# Patient Record
Sex: MALE | Race: WHITE | URBAN - METROPOLITAN AREA
[De-identification: names, ages, dates, MRNs, and addresses within clinical notes are randomized per-mention and may not be internally consistent; named-entity substitution may affect disease eponyms.]

---

## 2017-05-15 ENCOUNTER — PRE VISIT (OUTPATIENT)
Dept: ORTHOPEDICS | Facility: CLINIC | Age: 42
End: 2017-05-15

## 2017-05-15 NOTE — TELEPHONE ENCOUNTER
1.  Date/reason for appt: 5/22/17 1PM - Chondral Defect of Medial Femoral Condyle   2.  Referring provider: Dr. Dc Stevens  3.  Call to patient (Yes / No - short description): Yes - LVM for pt. Need SANDHYA form for [facility]  4.  Previous care at / records requested from:    - [facility] -- Records received, will forward to clinic.   - [facility] -- Pt has XR's here -- NEED SANDHYA      Included:  Office notes: 4/6/17, 3/27/17  Telephone note: 4/4/17  Radiology report: MRI R Knee 3/31/17 ([facility]) -- Contacted to mail CD of imaging

## 2017-05-15 NOTE — TELEPHONE ENCOUNTER
Spoke to pt's wife Grace, will email SANDHYA form to: [email or web address]    She will give to pt to sign and will return form.

## 2017-05-15 NOTE — TELEPHONE ENCOUNTER
Records received from [facility]. Waiting for image.   Included  Office notes: 10/6/16  Radiology reports: right knee on 10/6/16- report in records

## 2017-05-15 NOTE — TELEPHONE ENCOUNTER
SANDHYA received, faxed request to [facility] for records and XR imaging

## 2017-05-16 NOTE — TELEPHONE ENCOUNTER
Imaging disc received from [facility], will send to film room.    MRI R Knee 3/31/17

## 2017-05-22 ENCOUNTER — OFFICE VISIT (OUTPATIENT)
Dept: ORTHOPEDICS | Facility: CLINIC | Age: 42
End: 2017-05-22

## 2017-05-22 VITALS — BODY MASS INDEX: 30.8 KG/M2 | HEIGHT: 71 IN | WEIGHT: 220 LBS

## 2017-05-22 DIAGNOSIS — G89.29 CHRONIC PAIN OF RIGHT KNEE: Primary | ICD-10-CM

## 2017-05-22 DIAGNOSIS — M25.561 CHRONIC PAIN OF RIGHT KNEE: Primary | ICD-10-CM

## 2017-05-22 ASSESSMENT — ENCOUNTER SYMPTOMS
STIFFNESS: 0
NECK PAIN: 0
MYALGIAS: 0
MUSCLE CRAMPS: 0
MUSCLE WEAKNESS: 0
ARTHRALGIAS: 1
JOINT SWELLING: 1
BACK PAIN: 0

## 2017-05-22 NOTE — PROGRESS NOTES
ORTHOPEDIC CLINIC NOTE - NEW OUTPATIENT CONSULTATION       CHIEF COMPLAINT:  Right knee pain.      HISTORY OF PRESENT ILLNESS:  Mr. Slade is a 42-year-old male with approximately 6 months of right knee pain localized to the medial aspect of the knee.  There were no specific inciting events.  The pain is exacerbated by activity, and he does have significant pain by the end of day at [employer], where he is on his feet most of the day.  The patient has attempted an injection, which lasted only about a month, and has been using over-the-counter anti-inflammatories.  The patient is here to discuss possible operative intervention to alleviate his right knee pain.  The patient is having no left knee pain, no groin pain, no back pain.  The patient likes biking.  His pain is not significant while biking, but is significant while walking and attempting to use a treadmill.  The patient denies any injury to the right knee.      REVIEW OF SYSTEMS:  A 12-point review of systems is otherwise negative.      PAST MEDICAL HISTORY:  None.      PAST SURGICAL HISTORY:  None.      ALLERGIES:  None.      FAMILY HISTORY:  No bleeding, clotting disorders or reactions to anesthesia.      SOCIAL HISTORY:  The patient works as [employer] as a manager.  He lives in the [facility].  He is , and he is here with his wife.  He does not have any bad habits.  He does not smoke.      PHYSICAL EXAMINATION:  The patient is 5 feet 11 inches, 220 pounds, BMI 31.  He is in no acute distress, breathing comfortably.  Specific examination of the right knee demonstrates full extension to 120 degrees of flexion, stable to varus and valgus stress, anterior and posterior drawer.  No pain with hip range of motion.  No pain with straight leg raise.  The patient is tender over the medial joint line, nontender over the lateral joint line.  Nontender over the tibial tubercle.  Nontender over the pes anserinus insertion.  The patient has no significant  effusion.      IMAGING:  X-rays of the right knee are reviewed, which demonstrate well preserved medial joint space.  Standing alignment films are obtained today, which demonstrate the patient and is in 4 degrees of varus and would require a 7-degree correction if high tibial osteotomy were to be performed.  MRI of the right knee was previously obtained, which demonstrates a 7 x 6 mm medial femoral condyle cartilage defect with associated bone edema.  The remainder of the cartilage appears well preserved.  Menisci are intact.  ACL, PCL and collateral ligaments are also intact.      ASSESSMENT:   1.  Minimal osteoarthritis, right knee.   2.  Focal cartilage defect of the medial femoral condyle, 7 x 6 mm.   3.  4 degrees of varus alignment.      PLAN:  We discussed with Mr. Slade possible options moving forward, including continued observation, anti-inflammatories and medial off- brace.  We discussed cartilage procedures, including a possible high tibial osteotomy with an allograft cartilage procedure versus possible NeoCart randomization to either the NeoCart implantation versus microfracture.  The patient would like to enroll in the NeoCart study.  We will contact the study coordinators and get the patient enrolled.  We discussed the risks and benefits at length and alternatives.       Dr. Wayne saw and evaluated the patient, and is in agreement with the above stated plan.   Dictated by Brisa Ellis MD, Resident       Answers for HPI/ROS submitted by the patient on 5/22/2017   General Symptoms: No  Skin Symptoms: No  HENT Symptoms: No  EYE SYMPTOMS: No  HEART SYMPTOMS: No  LUNG SYMPTOMS: No  INTESTINAL SYMPTOMS: No  URINARY SYMPTOMS: No  REPRODUCTIVE SYMPTOMS: No  SKELETAL SYMPTOMS: Yes  BLOOD SYMPTOMS: No  NERVOUS SYSTEM SYMPTOMS: No  MENTAL HEALTH SYMPTOMS: No  Back pain: No  Muscle aches: No  Neck pain: No  Swollen joints: Yes  Joint pain: Yes  Bone pain: Yes  Muscle cramps: No  Muscle weakness:  No  Joint stiffness: No  Bone fracture: No

## 2017-05-22 NOTE — MR AVS SNAPSHOT
"              After Visit Summary   5/22/2017    Zelalem Slade    MRN: [id number]           Patient Information     Date Of Birth          1975        Visit Information        Provider Department      5/22/2017 1:00 PM Dhruv Wayne MD [facility]        Today's Diagnoses     Chronic pain of right knee    -  1       Follow-ups after your visit        Who to contact     Please call your clinic at [phone] to:    Ask questions about your health    Make or cancel appointments    Discuss your medicines    Learn about your test results    Speak to your doctor   If you have compliments or concerns about an experience at your clinic, or if you wish to file a complaint, please contact [facility] Patient Relations at [phone] or email us at [email or web address]         Additional Information About Your Visit        MyChart Information     [employer] gives you secure access to your electronic health record. If you see a primary care provider, you can also send messages to your care team and make appointments. If you have questions, please call your primary care clinic.  If you do not have a primary care provider, please call [phone] and they will assist you.      [employer] is an electronic gateway that provides easy, online access to your medical records. With [employer], you can request a clinic appointment, read your test results, renew a prescription or communicate with your care team.     To access your existing account, please contact your [facility] or call [phone] for assistance.        Care EveryWhere ID     This is your Care EveryWhere ID. This could be used by other organizations to access your [place] medical records  [id number]        Your Vitals Were     Height BMI (Body Mass Index)                5' 11\" (1.803 m) 30.68 kg/m2           Blood Pressure from Last 3 Encounters:   No data found for " BP    Weight from Last 3 Encounters:   05/22/17 220 lb (99.8 kg)               Primary Care Provider Office Phone # Fax #    Esteban Short [phone]       [facility] [place] [address]  [place] [address]        Thank you!     Thank you for choosing [facility]  for your care. Our goal is always to provide you with excellent care. Hearing back from our patients is one way we can continue to improve our services. Please take a few minutes to complete the written survey that you may receive in the mail after your visit with us. Thank you!             Your Updated Medication List - Protect others around you: Learn how to safely use, store and throw away your medicines at www.disposemymeds.org.      Notice  As of 5/22/2017 11:59 PM    You have not been prescribed any medications.

## 2017-05-22 NOTE — PROGRESS NOTES
Patient seen and examined with the resident.     Assesment: Right knee MFC chondral defect, localized    Plan: reviewed options, I think he is a candidate for cartilage reconstruction, explained study  - Neocart vs. Microfracture - patient interested, will have our  contact    If he is not a candidate for study and/or decides not to participate, the plan will be EUA, staging arthroscopy and debridement. If symtpoms persist will do OC allograft and HTO (or treatment as determined at timing of staging arthroscopy.    I agree with history, physical and imaging as well as the assessment and plan as detailed by Dr. Ellis.

## 2017-05-22 NOTE — LETTER
5/22/2017       RE: Zelalem Slade  [address] [place] [address]     Dear Colleague,    Thank you for referring your patient, Zelalem Slade, to the [facility]THOP[facility] at [facility]. Please see a copy of my visit note below.    Patient seen and examined with the resident.     Assesment: Right knee MFC chondral defect, localized    Plan: reviewed options, I think he is a candidate for cartilage reconstruction, explained study  - Neocart vs. Microfracture - patient interested, will have our  contact    If he is not a candidate for study and/or decides not to participate, the plan will be EUA, staging arthroscopy and debridement. If symtpoms persist will do OC allograft and HTO (or treatment as determined at timing of staging arthroscopy.    I agree with history, physical and imaging as well as the assessment and plan as detailed by Dr. Ellis.         ORTHOPEDIC CLINIC NOTE - NEW OUTPATIENT CONSULTATION       CHIEF COMPLAINT:  Right knee pain.      HISTORY OF PRESENT ILLNESS:  Mr. Slade is a 42-year-old male with approximately 6 months of right knee pain localized to the medial aspect of the knee.  There were no specific inciting events.  The pain is exacerbated by activity, and he does have significant pain by the end of day at [employer], where he is on his feet most of the day.  The patient has attempted an injection, which lasted only about a month, and has been using over-the-counter anti-inflammatories.  The patient is here to discuss possible operative intervention to alleviate his right knee pain.  The patient is having no left knee pain, no groin pain, no back pain.  The patient likes biking.  His pain is not significant while biking, but is significant while walking and attempting to use a treadmill.  The patient denies any injury to the right knee.      REVIEW OF SYSTEMS:  A 12-point review of systems is otherwise  negative.      PAST MEDICAL HISTORY:  None.      PAST SURGICAL HISTORY:  None.      ALLERGIES:  None.      FAMILY HISTORY:  No bleeding, clotting disorders or reactions to anesthesia.      SOCIAL HISTORY:  The patient works as [employer] as a manager.  [place].  He is , and he is here with his wife.  He does not have any bad habits.  He does not smoke.      PHYSICAL EXAMINATION:  The patient is 5 feet 11 inches, 220 pounds, BMI 31.  He is in no acute distress, breathing comfortably.  Specific examination of the right knee demonstrates full extension to 120 degrees of flexion, stable to varus and valgus stress, anterior and posterior drawer.  No pain with hip range of motion.  No pain with straight leg raise.  The patient is tender over the medial joint line, nontender over the lateral joint line.  Nontender over the tibial tubercle.  Nontender over the pes anserinus insertion.  The patient has no significant effusion.      IMAGING:  X-rays of the right knee are reviewed, which demonstrate well preserved medial joint space.  Standing alignment films are obtained today, which demonstrate the patient and is in 4 degrees of varus and would require a 7-degree correction if high tibial osteotomy were to be performed.  MRI of the right knee was previously obtained, which demonstrates a 7 x 6 mm medial femoral condyle cartilage defect with associated bone edema.  The remainder of the cartilage appears well preserved.  Menisci are intact.  ACL, PCL and collateral ligaments are also intact.      ASSESSMENT:   1.  Minimal osteoarthritis, right knee.   2.  Focal cartilage defect of the medial femoral condyle, 7 x 6 mm.   3.  4 degrees of varus alignment.      PLAN:  We discussed with Mr. Slade possible options moving forward, including continued observation, anti-inflammatories and medial off- brace.  We discussed cartilage procedures, including a possible high tibial osteotomy with an  allograft cartilage procedure versus possible NeoCart randomization to either the NeoCart implantation versus microfracture.  The patient would like to enroll in the NeoCart study.  We will contact the study coordinators and get the patient enrolled.  We discussed the risks and benefits at length and alternatives.       Dr. Wayne saw and evaluated the patient, and is in agreement with the above stated plan.   Dictated by Brisa Ellis MD, Resident         Again, thank you for allowing me to participate in the care of your patient.      Sincerely,    Dhruv Wayne MD

## 2017-05-22 NOTE — NURSING NOTE
"Reason For Visit:   Chief Complaint   Patient presents with     Musculoskeletal Problem     Chondral defect of medial femoral condyle, right knee, MRI in [facility], Dr Stevens referring [facility]     Age: 42 year old    Occupation:  at [employer].  Currently working? Yes.  Work status?  Full time.  Date of injury: 10/2/16, no traumatic injury, lots of walking/hunting caused flare up    Date of surgery: No surgery to knee    Smoker: No    Pain Assessment  Patient Currently in Pain: Yes  Primary Pain Location: Knee  Pain Orientation: Right  Pain Descriptors: Aching (\"Bone pain\")  Aggravating Factors: Walking, Bending    Ht 1.803 m (5' 11\")  Wt 99.8 kg (220 lb)  BMI 30.68 kg/m2                                                   "

## 2017-06-19 ENCOUNTER — TELEPHONE (OUTPATIENT)
Dept: ORTHOPEDICS | Facility: CLINIC | Age: 42
End: 2017-06-19

## 2017-06-19 NOTE — TELEPHONE ENCOUNTER
Tried to e-mail document again to e-mail listed.  Also sent a message to Stephanie at [facility] where patient will have surgery to see if they can e-mail the pre-op physical forms.  Called and left a message to let family know.  Saida Lemus RN

## 2017-06-19 NOTE — TELEPHONE ENCOUNTER
[facility] Call Center    Phone Message    Name of Caller: Carmina    Phone Number: Cell number on file:    Telephone Information:   Mobile [phone]       Best time to return call: ANY    May a detailed message be left on voicemail: yes    Relation to patient: Self    Reason for Call: Other: Please resend Pre op form, the email did not have an attachment sent. wanted clinic to know she has a Mac computer     Action Taken: Message routed to:  Adult Clinics: Orthopedics p [id number]

## 2017-06-29 ENCOUNTER — OFFICE VISIT (OUTPATIENT)
Dept: ORTHOPEDICS | Facility: CLINIC | Age: 42
End: 2017-06-29
Payer: COMMERCIAL

## 2017-06-29 VITALS — DIASTOLIC BLOOD PRESSURE: 94 MMHG | OXYGEN SATURATION: 96 % | HEART RATE: 68 BPM | SYSTOLIC BLOOD PRESSURE: 125 MMHG

## 2017-06-29 DIAGNOSIS — G89.29 CHRONIC PAIN OF RIGHT KNEE: Primary | ICD-10-CM

## 2017-06-29 DIAGNOSIS — M25.561 CHRONIC PAIN OF RIGHT KNEE: Primary | ICD-10-CM

## 2017-06-29 PROCEDURE — 99024 POSTOP FOLLOW-UP VISIT: CPT | Mod: GC | Performed by: ORTHOPAEDIC SURGERY

## 2017-06-29 ASSESSMENT — PAIN SCALES - GENERAL: PAINLEVEL: MODERATE PAIN (5)

## 2017-06-29 NOTE — MR AVS SNAPSHOT
After Visit Summary   6/29/2017    Zelalem Slade    MRN: [id number]           Patient Information     Date Of Birth          1975        Visit Information        Provider Department      6/29/2017 1:30 PM Dhruv Wayne MD [facility]        Today's Diagnoses     Chronic pain of right knee    -  1      Care Instructions    Thanks for coming today.  Ortho/Sports Medicine Clinic  [address] [place] [address]    To schedule future appointments in Ortho Clinic, you may call [phone].    To schedule ordered imaging by your Provider: Call [facility] at [phone]    [employer] available online at:   [email or web address]    Please call if any further questions or concerns [phone] and ask for the Orthopedic Department. Clinic hours 8 am to 5 pm.    Return to clinic if symptoms worsen.            Follow-ups after your visit        Who to contact     If you have questions or need follow up information about today's clinic visit or your schedule please contact [facility] directly at [phone].  Normal or non-critical lab and imaging results will be communicated to you by [employer], letter or phone within 4 business days after the clinic has received the results. If you do not hear from us within 7 days, please contact the clinic through [employer] or phone. If you have a critical or abnormal lab result, we will notify you by phone as soon as possible.  Submit refill requests through [employer] or call your pharmacy and they will forward the refill request to us. Please allow 3 business days for your refill to be completed.          Additional Information About Your Visit        [employer] Information     [employer] gives you secure access to your electronic health record. If you see a primary care provider, you can also send messages to your care team and make appointments. If you have questions, please call your primary care  clinic.  If you do not have a primary care provider, please call [phone] and they will assist you.      [employer] is an electronic gateway that provides easy, online access to your medical records. With [employer], you can request a clinic appointment, read your test results, renew a prescription or communicate with your care team.     To access your existing account, please contact your [facility] or call [phone] for assistance.        Care EveryWhere ID     This is your Care EveryWhere ID. This could be used by other organizations to access your [place] medical records  [id number]        Your Vitals Were     Pulse Pulse Oximetry                68 96%           Blood Pressure from Last 3 Encounters:   06/29/17 (!) 125/94    Weight from Last 3 Encounters:   05/22/17 99.8 kg (220 lb)              Today, you had the following     No orders found for display       Primary Care Provider Office Phone # Fax #    Esteban Short [phone]       [address] [address]        Equal Access to Services     [facility]: Hadii aad ku hadasho Soomaali, waaxda luqadaha, qaybta kaalmada adeegyada, waxay idiin hayaan ademallory khrosalina potts . So [facility] [phone].    ATENCIÓN: Si habla español, tiene a marley disposición servicios gratuitos de asistencia lingüística. Llame al [phone].    We comply with applicable federal civil rights laws and [place] laws. We do not discriminate on the basis of race, color, national origin, age, disability sex, sexual orientation or gender identity.            Thank you!     Thank you for choosing [facility]  for your care. Our goal is always to provide you with excellent care. Hearing back from our patients is one way we can continue to improve our services. Please take a few minutes to complete the written survey that you may receive in the mail after your visit with us. Thank  you!             Your Updated Medication List - Protect others around you: Learn how to safely use, store and throw away your medicines at www.disposemymeds.org.          This list is accurate as of: 6/29/17  1:53 PM.  Always use your most recent med list.                   Brand Name Dispense Instructions for use Diagnosis    ACETAMINOPHEN PO

## 2017-06-29 NOTE — PROGRESS NOTES
Patient seen and examined with the resident.     Assesment: 1 week following arthroscopic biopsy and enrollment in neocart prospective study    Plan: wbat, rom as gene, doing well, await implantation in august.     I agree with history, physical and imaging as well as the assessment and plan as detailed by Dr. Méndez.

## 2017-06-29 NOTE — NURSING NOTE
"Zelalem Slade's goals for this visit include: 1 week postop right knee  He requests these members of his care team be copied on today's visit information: yes    PCP: Esteban Short    Referring Provider:  No referring provider defined for this encounter.    Chief Complaint   Patient presents with     Surgical Followup     1 week postop-surgery at [facility]       Initial BP (!) 125/94  Pulse 68  SpO2 96% Estimated body mass index is 30.68 kg/(m^2) as calculated from the following:    Height as of 5/22/17: 1.803 m (5' 11\").    Weight as of 5/22/17: 99.8 kg (220 lb).  Medication Reconciliation: complete    "

## 2017-06-29 NOTE — LETTER
June 29, 2017      RE: Zelalem Slade  [address] [facility] [address]       To whom it may concern:    Zelalem Slade was seen in our clinic today. He may return to work for no more than 50 hours per week.     Sincerely,      Dhruv Wayne MD

## 2017-06-29 NOTE — PROGRESS NOTES
ORTHOPEDIC CLINIC NOTE     CHIEF COMPLAINT:  Follow up right knee arthroscopy and cartilage biopsy for neocart study, DOS 6/23/17 ([facility])     HISTORY OF PRESENT ILLNESS:  Mr. Slade is a 42-year-old male who returns 1 week s/p the above surgery. He has been doing well, did not like the oxycodone so he has been taking tylenol but this is giving him good pain control. 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 No concerns today.     PHYSICAL EXAMINATION:    Alert, pleasant, conversant male in no acute distress, breathing comfortably.    Examination of the right knee shows a small effusion, steristrips in place over his 2 portal incisions which are clean and dry, no erythema. No pain with gentle knee ROM 0-90. Stability not assessed today. Palpable DP and 5/5 strength throughout.          ASSESSMENT:   1.  Minimal osteoarthritis, right knee.   2.  Focal cartilage defect of the medial femoral condyle, 7 x 6 mm.   3.  1 week s/p right knee arthroscopy and cartilage biopsy for staged neocart proced     PLAN:  The patient may continue gentle activities as tolerated, no restrictions on walking or motion. No running, jumping, or pivoting/cutting type activities. He may shower but should not submerge incisions for 1 more week. We will coordinate his second stage surgery per the research protocol, likely later this summer but definitive date not yet set, no further follow up needed prior to that procedure.     Dr. Wayne saw and evaluated the patient, and is in agreement with the above stated plan.     Candida Méndez MD, Resident

## 2017-06-29 NOTE — PATIENT INSTRUCTIONS
Thanks for coming today.  Ortho/Sports Medicine Clinic  [address] [place] [address]    To schedule future appointments in Ortho Clinic, you may call [phone].    To schedule ordered imaging by your Provider: Call [facility] at [phone]    [employer] available online at:   [email or web address]    Please call if any further questions or concerns [phone] and ask for the Orthopedic Department. Clinic hours 8 am to 5 pm.    Return to clinic if symptoms worsen.

## 2017-08-15 ENCOUNTER — TRANSFERRED RECORDS (OUTPATIENT)
Dept: HEALTH INFORMATION MANAGEMENT | Facility: CLINIC | Age: 42
End: 2017-08-15

## 2017-08-16 ENCOUNTER — TELEPHONE (OUTPATIENT)
Dept: ORTHOPEDICS | Facility: CLINIC | Age: 42
End: 2017-08-16

## 2017-08-16 DIAGNOSIS — Z98.890 S/P RIGHT KNEE SURGERY: Primary | ICD-10-CM

## 2017-08-16 NOTE — TELEPHONE ENCOUNTER
Left voicemail for patient, in regards to his request for a shower chair and questions they have regarding dressing change. Callback number was left.

## 2017-08-18 DIAGNOSIS — Z98.890 S/P KNEE SURGERY: Primary | ICD-10-CM

## 2017-08-18 DIAGNOSIS — Z53.9 ERRONEOUS ENCOUNTER--DISREGARD: ICD-10-CM

## 2017-08-18 RX ORDER — GAUZE BANDAGE 4" X 4"
SPONGE TOPICAL
Qty: 10 EACH | Refills: 1 | Status: CANCELLED | OUTPATIENT
Start: 2017-08-18

## 2017-08-18 NOTE — TELEPHONE ENCOUNTER
Zelalem's wife Oneida called stating that Zelalem had surgery and is going to be able to shower in 3 days and she is wanting to know if we can give her a script for a shower chair and she also wants gauze to put back on the incision and how to dress it. She states she had wrist surgery in the past and they gave her a script for everything she needs and was hoping to get the same thing for him.   I told her in informed that we don't typically give scripts for wound management and dressing but I would ask Dr. Wayne. I told her we would be able to get him a shower chair and that I would touch base with her within the hour to let her know about both.   She also asked about getting him a wheelchair but I told her that we should wait to talk to Dr. Wayne at Zelalem's return appointment next Thursday, she was okay with this plan.    I talked with Jimena, Dr. Gomez's nurse, who didn't think a script for dressings would be a problem. Both were entered. Oneida is going to talk to the different supply companies in the area and see if they will allow a direct fax to them and if so she will get the fax and let me know where to send the scripts.

## 2017-08-18 NOTE — TELEPHONE ENCOUNTER
Zelalem's wife Oneida called back stating that their insurance company would not cover a shower chair thus they were cancelling their request for a prescription for this. In addition, she stated she would just go buy the needed dressings and did not need a prescription for those either. Discussed using 4 x 4 gauze and Telfa for dressing changes until he is seen in clinic at 1 week postop. Patient's wife expressed understanding.

## 2017-08-23 ENCOUNTER — TRANSFERRED RECORDS (OUTPATIENT)
Dept: HEALTH INFORMATION MANAGEMENT | Facility: CLINIC | Age: 42
End: 2017-08-23

## 2017-08-24 ENCOUNTER — OFFICE VISIT (OUTPATIENT)
Dept: ORTHOPEDICS | Facility: CLINIC | Age: 42
End: 2017-08-24
Payer: COMMERCIAL

## 2017-08-24 VITALS — DIASTOLIC BLOOD PRESSURE: 93 MMHG | SYSTOLIC BLOOD PRESSURE: 141 MMHG | HEART RATE: 104 BPM | OXYGEN SATURATION: 96 %

## 2017-08-24 DIAGNOSIS — Z98.890 S/P KNEE SURGERY: Primary | ICD-10-CM

## 2017-08-24 PROCEDURE — 99024 POSTOP FOLLOW-UP VISIT: CPT | Performed by: ORTHOPAEDIC SURGERY

## 2017-08-24 ASSESSMENT — PAIN SCALES - GENERAL: PAINLEVEL: MILD PAIN (3)

## 2017-08-24 NOTE — PROGRESS NOTES
HISTORY OF PRESENT ILLNESS:  Zelalem is a 42-year-old male who returns to my clinic today 1 week following NeoCart implantation to his right medial femoral condyle.  He is doing well.  He is nearly off narcotics and his pain is very well controlled.  He actually says he has no pain at rest.  He has been using his brace.  He is using his CPM machine.        PHYSICAL EXAMINATION:  Well-healed surgical incisions.  Neurovascularly intact.  Flexion is to greater than 90 degrees.      CLINICAL ASSESSMENT:  One week status post NeoCart implantation to medial femoral condyle, right knee.      PLAN:  I had a long discussion today with Zelalem.  At this time, I think he has done tremendously well.  He is toe touch weightbearing for 6 weeks.  He should wear a knee brace when he is up and around.  He can have the brace off when he is sitting and using CMP.  He should use compression when he is using the brace.  He should take an aspirin for the first 6 weeks.  Followup in my Orthopaedic Clinic in 6 weeks.

## 2017-08-24 NOTE — PATIENT INSTRUCTIONS
Thanks for coming today.  Ortho/Sports Medicine Clinic  [address] [place] [address]    To schedule future appointments in Ortho Clinic, you may call [phone].    To schedule ordered imaging by your Provider: Call [facility] at [phone]    [employer] available online at:   [email or web address]    Please call if any further questions or concerns [phone] and ask for the Orthopedic Department. Clinic hours 8 am to 5 pm.    Return to clinic if symptoms worsen.

## 2017-08-24 NOTE — NURSING NOTE
"Zelalem Slade's goals for this visit include: Neocart postop  He requests these members of his care team be copied on today's visit information: yes    PCP: Esteban Short    Referring Provider:  Dhruv Wayne MD  [address] [place] [address]    Chief Complaint   Patient presents with     Surgical Followup     Right knee neocart post op       Initial BP (!) 141/93  Pulse 104  SpO2 96% Estimated body mass index is 30.68 kg/(m^2) as calculated from the following:    Height as of 5/22/17: 1.803 m (5' 11\").    Weight as of 5/22/17: 99.8 kg (220 lb).  Medication Reconciliation: complete    "

## 2017-08-24 NOTE — MR AVS SNAPSHOT
After Visit Summary   8/24/2017    Zelalem Slade    MRN: [id number]           Patient Information     Date Of Birth          1975        Visit Information        Provider Department      8/24/2017 1:00 PM Dhruv Wayne MD [facility]        Today's Diagnoses     S/P knee surgery    -  1      Care Instructions    Thanks for coming today.  Ortho/Sports Medicine Clinic  [address] [place] [address]    To schedule future appointments in Ortho Clinic, you may call [phone].    To schedule ordered imaging by your Provider: Call [facility] at [phone]    [employer] available online at:   [email or web address]    Please call if any further questions or concerns [phone] and ask for the Orthopedic Department. Clinic hours 8 am to 5 pm.    Return to clinic if symptoms worsen.            Follow-ups after your visit        Your next 10 appointments already scheduled     Sep 28, 2017 11:00 AM CDT   Return Visit with Dhruv Wayne MD   [facility] ([facility]    [address] [place] [address] [phone]              Who to contact     If you have questions or need follow up information about today's clinic visit or your schedule please contact [facility] directly at [phone].  Normal or non-critical lab and imaging results will be communicated to you by MyChart, letter or phone within 4 business days after the clinic has received the results. If you do not hear from us within 7 days, please contact the clinic through MyChart or phone. If you have a critical or abnormal lab result, we will notify you by phone as soon as possible.  Submit refill requests through RotoHog or call your pharmacy and they will forward the refill request to us. Please allow 3 business days for your refill to be completed.          Additional Information About Your Visit         Opowerhart Information     Pins gives you secure access to your electronic health record. If you see a primary care provider, you can also send messages to your care team and make appointments. If you have questions, please call your primary care clinic.  If you do not have a primary care provider, please call [phone] and they will assist you.      Pins is an electronic gateway that provides easy, online access to your medical records. With Pins, you can request a clinic appointment, read your test results, renew a prescription or communicate with your care team.     To access your existing account, please contact your [facility] or call [phone] for assistance.        Care EveryWhere ID     This is your Care EveryWhere ID. This could be used by other organizations to access your [place] medical records  [id number]        Your Vitals Were     Pulse Pulse Oximetry                104 96%           Blood Pressure from Last 3 Encounters:   08/24/17 (!) 141/93   06/29/17 (!) 125/94    Weight from Last 3 Encounters:   05/22/17 99.8 kg (220 lb)              Today, you had the following     No orders found for display         Today's Medication Changes          These changes are accurate as of: 8/24/17 11:59 PM.  If you have any questions, ask your nurse or doctor.               Start taking these medicines.        Dose/Directions    order for DME   Used for:  S/P knee surgery        Please dispense wheelchair   Quantity:  1 Device   Refills:  0            Where to get your medicines      Some of these will need a paper prescription and others can be bought over the counter.  Ask your nurse if you have questions.     Bring a paper prescription for each of these medications     order for DME                Primary Care Provider Office Phone # Fax #    Esteban Short [phone] [address] [facility] [address]        Equal Access to Services      EVARISTO PEREA : Hadii aad alison ean Baer, waaxda luqadaha, qaybta kaalmada juanaedwindeo, sandhya brian haygeraldine harrisirenemadison potts . So [facility] [phone].    ATENCIÓN: Si habla español, tiene a marley disposición servicios gratuitos de asistencia lingüística. Llame al [phone].    We comply with applicable federal civil rights laws and [place] laws. We do not discriminate on the basis of race, color, national origin, age, disability sex, sexual orientation or gender identity.            Thank you!     Thank you for choosing [facility]  for your care. Our goal is always to provide you with excellent care. Hearing back from our patients is one way we can continue to improve our services. Please take a few minutes to complete the written survey that you may receive in the mail after your visit with us. Thank you!             Your Updated Medication List - Protect others around you: Learn how to safely use, store and throw away your medicines at www.disposemymeds.org.          This list is accurate as of: 8/24/17 11:59 PM.  Always use your most recent med list.                   Brand Name Dispense Instructions for use Diagnosis    ACETAMINOPHEN PO           ASPIRIN PO      Take 81 mg by mouth        order for DME     1 Device    Please dispense wheelchair    S/P knee surgery

## 2017-09-28 ENCOUNTER — OFFICE VISIT (OUTPATIENT)
Dept: ORTHOPEDICS | Facility: CLINIC | Age: 42
End: 2017-09-28
Payer: COMMERCIAL

## 2017-09-28 VITALS — SYSTOLIC BLOOD PRESSURE: 142 MMHG | OXYGEN SATURATION: 97 % | HEART RATE: 119 BPM | DIASTOLIC BLOOD PRESSURE: 92 MMHG

## 2017-09-28 DIAGNOSIS — Z98.890 S/P KNEE SURGERY: Primary | ICD-10-CM

## 2017-09-28 PROCEDURE — 99024 POSTOP FOLLOW-UP VISIT: CPT | Performed by: ORTHOPAEDIC SURGERY

## 2017-09-28 ASSESSMENT — PAIN SCALES - GENERAL: PAINLEVEL: NO PAIN (0)

## 2017-09-28 NOTE — MR AVS SNAPSHOT
After Visit Summary   9/28/2017    Zelalem Slade    MRN: [id number]           Patient Information     Date Of Birth          1975        Visit Information        Provider Department      9/28/2017 11:00 AM Dhruv Wayne MD [facility]        Today's Diagnoses     S/P knee surgery    -  1      Care Instructions    Thanks for coming today.  Ortho/Sports Medicine Clinic  [address] [place] [address]    To schedule future appointments in Ortho Clinic, you may call [phone].    To schedule ordered imaging by your Provider: Call [facility] at [phone]    M[employer]en available online at:   [email or web address]    Please call if any further questions or concerns [phone] and ask for the Orthopedic Department. Clinic hours 8 am to 5 pm.    Return to clinic if symptoms worsen.            Follow-ups after your visit        Your next 10 appointments already scheduled     Nov 09, 2017 11:30 AM CST   Return Visit with Dhruv Wayne MD   [facility] ([facility])    [address] [place] [address] [phone]              Who to contact     If you have questions or need follow up information about today's clinic visit or your schedule please contact [facility] directly at [phone].  Normal or non-critical lab and imaging results will be communicated to you by MyChart, letter or phone within 4 business days after the clinic has received the results. If you do not hear from us within 7 days, please contact the clinic through MyChart or phone. If you have a critical or abnormal lab result, we will notify you by phone as soon as possible.  Submit refill requests through Macheen or call your pharmacy and they will forward the refill request to us. Please allow 3 business days for your refill to be completed.          Additional Information About Your Visit         Familiohart Information     Pageflakes gives you secure access to your electronic health record. If you see a primary care provider, you can also send messages to your care team and make appointments. If you have questions, please call your primary care clinic.  If you do not have a primary care provider, please call [phone] and they will assist you.      Pageflakes is an electronic gateway that provides easy, online access to your medical records. With Pageflakes, you can request a clinic appointment, read your test results, renew a prescription or communicate with your care team.     To access your existing account, please contact your [facility] or call [phone] for assistance.        Care EveryWhere ID     This is your Care EveryWhere ID. This could be used by other organizations to access your [place] medical records  [id number]        Your Vitals Were     Pulse Pulse Oximetry                119 97%           Blood Pressure from Last 3 Encounters:   09/28/17 (!) 142/92   08/24/17 (!) 141/93   06/29/17 (!) 125/94    Weight from Last 3 Encounters:   05/22/17 99.8 kg (220 lb)              Today, you had the following     No orders found for display       Primary Care Provider Office Phone # Fax #    Esteban Short [phone] [address] [facility] [address]        Equal Access to Services     EVARISTO PEREA AH: Hadii aad ku hadasho Soomaali, waaxda luqadaha, qaybta kaalmada adeegyada, waxay lisain haygeraldine potts . So [facility] [phone].    ATENCIÓN: Si habla español, tiene a marley disposición servicios gratuitos de asistencia lingüística. Llame al [phone].    We comply with applicable federal civil rights laws and [place] laws. We do not discriminate on the basis of race, color, national origin, age, disability sex, sexual orientation or gender identity.            Thank you!     Thank you for choosing [facility]  for  your care. Our goal is always to provide you with excellent care. Hearing back from our patients is one way we can continue to improve our services. Please take a few minutes to complete the written survey that you may receive in the mail after your visit with us. Thank you!             Your Updated Medication List - Protect others around you: Learn how to safely use, store and throw away your medicines at www.disposemymeds.org.          This list is accurate as of: 9/28/17  4:17 PM.  Always use your most recent med list.                   Brand Name Dispense Instructions for use Diagnosis    ACETAMINOPHEN PO           ASPIRIN PO      Take 81 mg by mouth        order for DME     1 Device    Please dispense wheelchair    S/P knee surgery

## 2017-09-28 NOTE — NURSING NOTE
"Zelalem Slade's goals for this visit include: Right knee neocart 6 week check   He requests these members of his care team be copied on today's visit information: yes    PCP: Esteban Short    Referring Provider:  Dhruv Wayne MD  [address] [place] [address]    Chief Complaint   Patient presents with     RECHECK     6 week neocart recheck        Initial BP (!) 142/92  Pulse 119  SpO2 97% Estimated body mass index is 30.68 kg/(m^2) as calculated from the following:    Height as of 5/22/17: 1.803 m (5' 11\").    Weight as of 5/22/17: 99.8 kg (220 lb).  Medication Reconciliation: complete    "

## 2017-09-28 NOTE — PATIENT INSTRUCTIONS
Thanks for coming today.  Ortho/Sports Medicine Clinic  [address] [place] [address]    To schedule future appointments in Ortho Clinic, you may call [phone].    To schedule ordered imaging by your Provider: Call [facility] at [phone]    [employer] available online at:   [email or web address]    Please call if any further questions or concerns [phone] and ask for the Orthopedic Department. Clinic hours 8 am to 5 pm.    Return to clinic if symptoms worsen.

## 2017-09-28 NOTE — PROGRESS NOTES
HISTORY OF PRESENT ILLNESS:  Zelalem is a pleasant 42-year-old male seen through my Orthopaedic Clinic.  He is now 6 weeks status post NeoCart implantation to his right knee.  He has been doing very well, is not having any pain.  He is doing therapy.  He is making good progress with the brace and his crutches.      PHYSICAL EXAMINATION:  On exam, marvin male in no acute distress, articulate and interactive.  Well-healed surgical incisions, full range of motion.  Trace effusion, ligament stable.  Neurovascularly intact.      CLINICAL ASSESSMENT:  Six weeks status post NeoCart implantation, right medial femoral condyle.      PLAN:  I had a long discussion today with Zelalem.  At this time, I think he has done tremendously well, weightbearing as tolerated, range of motion as tolerated, discontinued crutches, discontinue brace.  Followup in my clinic in 6 weeks.

## 2017-10-17 ENCOUNTER — TRANSFERRED RECORDS (OUTPATIENT)
Dept: HEALTH INFORMATION MANAGEMENT | Facility: CLINIC | Age: 42
End: 2017-10-17

## 2017-11-09 ENCOUNTER — OFFICE VISIT (OUTPATIENT)
Dept: ORTHOPEDICS | Facility: CLINIC | Age: 42
End: 2017-11-09
Payer: COMMERCIAL

## 2017-11-09 VITALS — HEART RATE: 83 BPM | OXYGEN SATURATION: 96 % | SYSTOLIC BLOOD PRESSURE: 144 MMHG | DIASTOLIC BLOOD PRESSURE: 90 MMHG

## 2017-11-09 DIAGNOSIS — Z98.890 S/P KNEE SURGERY: Primary | ICD-10-CM

## 2017-11-09 PROCEDURE — 99024 POSTOP FOLLOW-UP VISIT: CPT | Performed by: ORTHOPAEDIC SURGERY

## 2017-11-09 ASSESSMENT — PAIN SCALES - GENERAL: PAINLEVEL: MILD PAIN (2)

## 2017-11-09 NOTE — PROGRESS NOTES
HISTORY OF PRESENT ILLNESS:  Zelalem is a 42-year-old male who returns to my clinic today 3 months following NeoCart implantation to his right medial femoral condyle.  SANE score is 50/100, his preoperative score was 30.  He is happy with the progress, he feels improved from his preoperative state.  He is off narcotics.  He feels like he is walking better and he is more active.      PHYSICAL EXAMINATION:  Pleasant male in no acute distress, articulate and interactive.  Visual inspection shows no redness, no drainage or suggestion of infection.  Ligament stable and neurovascularly intact.  Motion is full on my exam.  Trace effusion at most.      CLINICAL ASSESSMENT:  Three months following the NeoCart implantation mediofemoral condyle, right knee.      PLAN:  I had a long discussion today with Zelalem.  At this time, I think he is doing very well.  He is weightbearing as tolerated.  He is motion as tolerated.  He should work with his therapist to wean from therapy onto a home program.  He will be moving likely to [place], [place] in the next few months and I think hopefully by point he can be doing therapy on his own.  I will plan to see him per the protocol at the 6-month johanne.  I am happy to do this at the [place] as it is somewhat closer for him when he comes down.  He needs to get an MRI that same day and we will try to coordinate this given his long travel time.

## 2017-11-09 NOTE — NURSING NOTE
"Zelalem Slade's goals for this visit include: Right knee 3 month recheck   He requests these members of his care team be copied on today's visit information: yes    PCP: Esteban Short    Referring Provider:  Dhruv Wayne MD  [address] [place] [address]    Chief Complaint   Patient presents with     RECHECK     3 month neocart right knee       Initial /90  Pulse 83  SpO2 96% Estimated body mass index is 30.68 kg/(m^2) as calculated from the following:    Height as of 5/22/17: 1.803 m (5' 11\").    Weight as of 5/22/17: 99.8 kg (220 lb).  Medication Reconciliation: complete    "

## 2017-11-09 NOTE — MR AVS SNAPSHOT
After Visit Summary   11/9/2017    Zelalem Slade    MRN: [id number]           Patient Information     Date Of Birth          1975        Visit Information        Provider Department      11/9/2017 11:30 AM Dhruv Wayne MD [facility]        Today's Diagnoses     S/P knee surgery    -  1      Care Instructions    Thanks for coming today.  Ortho/Sports Medicine Clinic  [address] [place] [address]    To schedule future appointments in Ortho Clinic, you may call [phone].    To schedule ordered imaging by your Provider: Call [facility] at [phone]    [employer] available online at:   [email or web address]    Please call if any further questions or concerns [phone] and ask for the Orthopedic Department. Clinic hours 8 am to 5 pm.    Return to clinic if symptoms worsen.            Follow-ups after your visit        Who to contact     If you have questions or need follow up information about today's clinic visit or your schedule please contact [facility] directly at [phone].  Normal or non-critical lab and imaging results will be communicated to you by Validroidhart, letter or phone within 4 business days after the clinic has received the results. If you do not hear from us within 7 days, please contact the clinic through Tail-f Systemst or phone. If you have a critical or abnormal lab result, we will notify you by phone as soon as possible.  Submit refill requests through Redtree People or call your pharmacy and they will forward the refill request to us. Please allow 3 business days for your refill to be completed.          Additional Information About Your Visit        Validroidhart Information     Redtree People gives you secure access to your electronic health record. If you see a primary care provider, you can also send messages to your care team and make appointments. If you have questions, please call your primary care clinic.  If you  do not have a primary care provider, please call [phone] and they will assist you.      Kaldoora is an electronic gateway that provides easy, online access to your medical records. With Kaldoora, you can request a clinic appointment, read your test results, renew a prescription or communicate with your care team.     To access your existing account, please contact your [facility] or call [phone] for assistance.        Care EveryWhere ID     This is your Care EveryWhere ID. This could be used by other organizations to access your [place] medical records  [id number]        Your Vitals Were     Pulse Pulse Oximetry                83 96%           Blood Pressure from Last 3 Encounters:   11/09/17 144/90   09/28/17 (!) 142/92   08/24/17 (!) 141/93    Weight from Last 3 Encounters:   05/22/17 99.8 kg (220 lb)              Today, you had the following     No orders found for display       Primary Care Provider Office Phone # Fax #    Esteban Short [phone] [address] [facility] [address]        Equal Access to Services     [facility] : Hadii aad ku hadasho Soomaali, waaxda luqadaha, qaybta kaalmada ademalloryyada, sandhya potts . So [facility] [phone].    ATENCIÓN: Si habla español, tiene a marley disposición servicios gratuitos de asistencia lingüística. [facility] [phone].    We comply with applicable federal civil rights laws and [place] laws. We do not discriminate on the basis of race, color, national origin, age, disability, sex, sexual orientation, or gender identity.            Thank you!     Thank you for choosing [facility]  for your care. Our goal is always to provide you with excellent care. Hearing back from our patients is one way we can continue to improve our services. Please take a few minutes to complete the written survey that you may receive in the mail after your visit with us.  Thank you!             Your Updated Medication List - Protect others around you: Learn how to safely use, store and throw away your medicines at www.disposemymeds.org.          This list is accurate as of: 11/9/17  1:24 PM.  Always use your most recent med list.                   Brand Name Dispense Instructions for use Diagnosis    ACETAMINOPHEN PO           ASPIRIN PO      Take 81 mg by mouth        order for DME     1 Device    Please dispense wheelchair    S/P knee surgery

## 2017-11-09 NOTE — PATIENT INSTRUCTIONS
Thanks for coming today.  Ortho/Sports Medicine Clinic  [address] [place] [address]    To schedule future appointments in Ortho Clinic, you may call [phone].    To schedule ordered imaging by your Provider: Call [facility] at [phone]    [employer] available online at:   [email or web address]    Please call if any further questions or concerns [phone] and ask for the Orthopedic Department. Clinic hours 8 am to 5 pm.    Return to clinic if symptoms worsen.

## 2018-04-05 ENCOUNTER — OFFICE VISIT (OUTPATIENT)
Dept: ORTHOPEDICS | Facility: CLINIC | Age: 43
End: 2018-04-05
Payer: COMMERCIAL

## 2018-04-05 VITALS — OXYGEN SATURATION: 98 % | SYSTOLIC BLOOD PRESSURE: 128 MMHG | HEART RATE: 77 BPM | DIASTOLIC BLOOD PRESSURE: 96 MMHG

## 2018-04-05 DIAGNOSIS — Z98.890 S/P KNEE SURGERY: Primary | ICD-10-CM

## 2018-04-05 PROCEDURE — 99207 ZZC NO CHARGE LOS: CPT | Performed by: ORTHOPAEDIC SURGERY

## 2018-04-05 ASSESSMENT — PAIN SCALES - GENERAL: PAINLEVEL: NO PAIN (0)

## 2018-04-05 NOTE — PROGRESS NOTES
DIAGNOSIS:   1. Full-thickness chondral defect medial femoral condyle right knee    PROCEDURES:  1. Neocart implantation to medial femoral condyle right knee    HISTORY:  Zelalem returns to clinic today for interval follow-up is a very pleasant male who is now 6 months following his Neocart implantation surgery.  He is doing tremendously well.  His SANE score is 92 out of 100.  Versus a preop of 30.  He is very happy with the progress that is made.  He does not report any specific limitations    EXAM:     General: Awake, Alert, and oriented. Articulates and communicates with a normal affect     Right lower Extremity:    Incisions well healed without evidence of infection    No effusion    Rang range of motion is full.      Ligaments stable    Neurovascularly intact    IMAGING:  MRI obtained to the research protocol shows excellent incorporation on the medial femoral condyle with no full-thickness chondral defects    ASSESSMENT:  1. 6 months following Neocart implantation to the medial femoral condyle right knee.     PLAN:   Weightbearing as tolerated, motion as tolerated, no specific activity restrictions besides commonsense.  Follow-up at the one-year per the protocol.

## 2018-04-05 NOTE — PATIENT INSTRUCTIONS
Thanks for coming today.  Ortho/Sports Medicine Clinic  [address] [place] [address]    To schedule future appointments in Ortho Clinic, you may call [phone].    To schedule ordered imaging by your Provider: Call [facility] at [phone]    Gigaom available online at:   [email or web address]    Please call if any further questions or concerns [phone] and ask for the Orthopedic Department. Clinic hours 8 am to 5 pm.    Return to clinic if symptoms worsen.

## 2018-04-05 NOTE — LETTER
4/5/2018         RE: Zelalem Slade  [address] [facility] [address]        Dear Colleague,    Thank you for referring your patient, Zelalem Slade, to the [facility]. Please see a copy of my visit note below.    DIAGNOSIS:   1. Full-thickness chondral defect medial femoral condyle right knee    PROCEDURES:  1. Neocart implantation to medial femoral condyle right knee    HISTORY:  Zelalem returns to clinic today for interval follow-up is a very pleasant male who is now 6 months following his Neocart implantation surgery.  He is doing tremendously well.  His SANE score is 92 out of 100.  Versus a preop of 30.  He is very happy with the progress that is made.  He does not report any specific limitations    EXAM:     General: Awake, Alert, and oriented. Articulates and communicates with a normal affect     Right lower Extremity:    Incisions well healed without evidence of infection    No effusion    Rang range of motion is full.      Ligaments stable    Neurovascularly intact    IMAGING:  MRI obtained to the research protocol shows excellent incorporation on the medial femoral condyle with no full-thickness chondral defects    ASSESSMENT:  1. 6 months following Neocart implantation to the medial femoral condyle right knee.     PLAN:   Weightbearing as tolerated, motion as tolerated, no specific activity restrictions besides commonsense.  Follow-up at the one-year per the protocol.        Again, thank you for allowing me to participate in the care of your patient.        Sincerely,        Dhruv Wayne MD

## 2018-04-05 NOTE — NURSING NOTE
"Zelalem Slade's goals for this visit include: Right knee neokart study check   He requests these members of his care team be copied on today's visit information: yes    PCP: Esteban Short    Referring Provider:  Dhruv Wayne MD  [address]  [place] [address]    No chief complaint on file.      Initial BP (!) 128/96  Pulse 77  SpO2 98% Estimated body mass index is 30.68 kg/(m^2) as calculated from the following:    Height as of 5/22/17: 5' 11\" (1.803 m).    Weight as of 5/22/17: 220 lb (99.8 kg).  Medication Reconciliation: complete    "

## 2018-04-05 NOTE — MR AVS SNAPSHOT
After Visit Summary   4/5/2018    Zelalem Slade    MRN: [id number]           Patient Information     Date Of Birth          1975        Visit Information        Provider Department      4/5/2018 11:30 AM Dhruv Wayne MD [facility]        Today's Diagnoses     S/P knee surgery    -  1      Care Instructions    Thanks for coming today.  Ortho/Sports Medicine Clinic  [address] [place] [address]    To schedule future appointments in Ortho Clinic, you may call [phone].    To schedule ordered imaging by your Provider: Call [facility] at [phone]    Wonder Technologies available online at:   Gifts [email or web address] Give/Segway    Please call if any further questions or concerns [phone] and ask for the Orthopedic Department. Clinic hours 8 am to 5 pm.    Return to clinic if symptoms worsen.            Follow-ups after your visit        Who to contact     If you have questions or need follow up information about today's clinic visit or your schedule please contact [facility] directly at [phone].  Normal or non-critical lab and imaging results will be communicated to you by Wiral Internet Grouphart, letter or phone within 4 business days after the clinic has received the results. If you do not hear from us within 7 days, please contact the clinic through Ubiterrat or phone. If you have a critical or abnormal lab result, we will notify you by phone as soon as possible.  Submit refill requests through Wonder Technologies or call your pharmacy and they will forward the refill request to us. Please allow 3 business days for your refill to be completed.          Additional Information About Your Visit        Wiral Internet Grouphart Information     Wonder Technologies gives you secure access to your electronic health record. If you see a primary care provider, you can also send messages to your care team and make appointments. If you have questions, please call your primary care clinic.  If you  do not have a primary care provider, please call [phone] and they will assist you.      Cavis microcaps is an electronic gateway that provides easy, online access to your medical records. With Cavis microcaps, you can request a clinic appointment, read your test results, renew a prescription or communicate with your care team.     To access your existing account, please contact your [facility] or call [phone] for assistance.        Care EveryWhere ID     This is your Care EveryWhere ID. This could be used by other organizations to access your [facility] medical records  [id number]        Your Vitals Were     Pulse Pulse Oximetry                77 98%           Blood Pressure from Last 3 Encounters:   04/05/18 (!) 128/96   11/09/17 144/90   09/28/17 (!) 142/92    Weight from Last 3 Encounters:   05/22/17 220 lb (99.8 kg)              Today, you had the following     No orders found for display       Primary Care Provider Office Phone # Fax #    Esteban Short [phone] [address] [facility] [address]        Equal Access to Services     [facility]: Hadii aad ku hadasho Soomaali, waaxda luqadaha, qaybta kaalmada ademalloryyada, sandhya potts . So [facility] [phone].    ATENCIÓN: Si habla español, tiene a marley disposición servicios gratuitos de asistencia lingüística. [facility] [phone].    We comply with applicable federal civil rights laws and [place] laws. We do not discriminate on the basis of race, color, national origin, age, disability, sex, sexual orientation, or gender identity.            Thank you!     Thank you for choosing [facility]  for your care. Our goal is always to provide you with excellent care. Hearing back from our patients is one way we can continue to improve our services. Please take a few minutes to complete the written survey that you may receive in the mail after your visit with us.  Thank you!             Your Updated Medication List - Protect others around you: Learn how to safely use, store and throw away your medicines at www.disposemymeds.org.          This list is accurate as of 4/5/18  1:57 PM.  Always use your most recent med list.                   Brand Name Dispense Instructions for use Diagnosis    ACETAMINOPHEN PO           ASPIRIN PO      Take 81 mg by mouth        order for DME     1 Device    Please dispense wheelchair    S/P knee surgery

## 2018-07-26 ENCOUNTER — OFFICE VISIT (OUTPATIENT)
Dept: ORTHOPEDICS | Facility: CLINIC | Age: 43
End: 2018-07-26
Payer: COMMERCIAL

## 2018-07-26 VITALS — HEART RATE: 69 BPM | OXYGEN SATURATION: 99 % | SYSTOLIC BLOOD PRESSURE: 128 MMHG | DIASTOLIC BLOOD PRESSURE: 88 MMHG

## 2018-07-26 DIAGNOSIS — Z98.890 S/P KNEE SURGERY: Primary | ICD-10-CM

## 2018-07-26 PROCEDURE — 99212 OFFICE O/P EST SF 10 MIN: CPT | Performed by: ORTHOPAEDIC SURGERY

## 2018-07-26 NOTE — NURSING NOTE
Zelalem Slade's chief complaint for this visit includes:  Chief Complaint   Patient presents with     RECHECK     1 year neokart check      PCP: Esteban Short    Referring Provider:  No referring provider defined for this encounter.    /88  Pulse 69  SpO2 99%  Data Unavailable     Do you need any medication refills at today's visit? no

## 2018-07-26 NOTE — PROGRESS NOTES
DIAGNOSIS:   1. FT chondral defect [facility] right knee    PROCEDURES:  1.  Acuity of the Neocart implantation medial femoral condyle right knee    DOS - 6/23/17    HISTORY:  Doing tremendously well.  SANE score of 95.  Preop 45.  Very happy.  Would clearly repeat surgery.    EXAM:     General: Awake, Alert, and oriented. Articulates and communicates with a normal affect     Right lower Extremity:    Incisions well healed without evidence of infection    Full motion    Ligament stable    No crepitation    Neurovascularly intact    IMAGING:  MRI shows excellent fill of medial femoral condyle chondral defect    ASSESSMENT:  1. 1 year status post Neocart implantation medial femoral condyle right knee    PLAN:   Activity as tolerated follow-up per protocol

## 2018-07-26 NOTE — MR AVS SNAPSHOT
After Visit Summary   7/26/2018    Zelalem Slade    MRN: [id number]           Patient Information     Date Of Birth          1975        Visit Information        Provider Department      7/26/2018 11:00 AM Dhruv Wayne MD [facility]        Today's Diagnoses     S/P knee surgery    -  1      Care Instructions    Thanks for coming today.  Ortho/Sports Medicine Clinic  [address] [place] [address]    To schedule future appointments in Ortho Clinic, you may call [phone].    To schedule ordered imaging by your Provider: Call [facility] at [phone]    digitalbox available online at:   Beetle Beats/Net Transmit & Receive    Please call if any further questions or concerns [phone] and ask for the Orthopedic Department. Clinic hours 8 am to 5 pm.    Return to clinic if symptoms worsen.            Follow-ups after your visit        Who to contact     If you have questions or need follow up information about today's clinic visit or your schedule please contact [facility] directly at [phone].  Normal or non-critical lab and imaging results will be communicated to you by Gemfirehart, letter or phone within 4 business days after the clinic has received the results. If you do not hear from us within 7 days, please contact the clinic through SocialRadart or phone. If you have a critical or abnormal lab result, we will notify you by phone as soon as possible.  Submit refill requests through digitalbox or call your pharmacy and they will forward the refill request to us. Please allow 3 business days for your refill to be completed.          Additional Information About Your Visit        Gemfirehart Information     digitalbox gives you secure access to your electronic health record. If you see a primary care provider, you can also send messages to your care team and make appointments. If you have questions, please call your primary care clinic.  If you  do not have a primary care provider, please call [phone] and they will assist you.      GridCraft is an electronic gateway that provides easy, online access to your medical records. With GridCraft, you can request a clinic appointment, read your test results, renew a prescription or communicate with your care team.     To access your existing account, please contact your [facility] or call [phone] for assistance.        Care EveryWhere ID     This is your Care EveryWhere ID. This could be used by other organizations to access your [place] medical records  [id number]        Your Vitals Were     Pulse Pulse Oximetry                69 99%           Blood Pressure from Last 3 Encounters:   07/26/18 128/88   04/05/18 (!) 128/96   11/09/17 144/90    Weight from Last 3 Encounters:   05/22/17 99.8 kg (220 lb)              Today, you had the following     No orders found for display       Primary Care Provider Office Phone # Fax #    Esteban Short [phone] [address] [facility] [address]        Equal Access to Services     [facility]: Hadii aad ku hadasho Soomaali, waaxda luqadaha, qaybta kaalmada adeegyada, sandhya potts . So [facility] [phone].    ATENCIÓN: Si habla español, tiene a marley disposición servicios gratuitos de asistencia lingüística. [facility] [phone].    We comply with applicable federal civil rights laws and [place] laws. We do not discriminate on the basis of race, color, national origin, age, disability, sex, sexual orientation, or gender identity.            Thank you!     Thank you for choosing [facility]  for your care. Our goal is always to provide you with excellent care. Hearing back from our patients is one way we can continue to improve our services. Please take a few minutes to complete the written survey that you may receive in the mail after your visit with us. Thank  you!             Your Updated Medication List - Protect others around you: Learn how to safely use, store and throw away your medicines at www.disposemymeds.org.          This list is accurate as of 7/26/18  1:07 PM.  Always use your most recent med list.                   Brand Name Dispense Instructions for use Diagnosis    ACETAMINOPHEN PO           ASPIRIN PO      Take 81 mg by mouth        order for DME     1 Device    Please dispense wheelchair    S/P knee surgery

## 2018-07-26 NOTE — LETTER
7/26/2018         RE: Zelalem Slade  [address] [facility] [address]        Dear Colleague,    Thank you for referring your patient, Zelalem Slade, to the [facility]. Please see a copy of my visit note below.    DIAGNOSIS:   1. FT chondral defect [facility] right knee    PROCEDURES:  1.  Acuity of the Neocart implantation medial femoral condyle right knee    DOS - 6/23/17    HISTORY:  Doing tremendously well.  SANE score of 95.  Preop 45.  Very happy.  Would clearly repeat surgery.    EXAM:     General: Awake, Alert, and oriented. Articulates and communicates with a normal affect     Right lower Extremity:    Incisions well healed without evidence of infection    Full motion    Ligament stable    No crepitation    Neurovascularly intact    IMAGING:  MRI shows excellent fill of medial femoral condyle chondral defect    ASSESSMENT:  1. 1 year status post Neocart implantation medial femoral condyle right knee    PLAN:   Activity as tolerated follow-up per protocol        Again, thank you for allowing me to participate in the care of your patient.        Sincerely,        Dhruv Wayne MD

## 2018-07-26 NOTE — PATIENT INSTRUCTIONS
Thanks for coming today.  Ortho/Sports Medicine Clinic  [address] [place] [address]    To schedule future appointments in Ortho Clinic, you may call [phone].    To schedule ordered imaging by your Provider: Call [facility] at [phone]    Jobspotting available online at:   [email or web address]    Please call if any further questions or concerns [phone] and ask for the Orthopedic Department. Clinic hours 8 am to 5 pm.    Return to clinic if symptoms worsen.

## 2020-03-02 ENCOUNTER — HEALTH MAINTENANCE LETTER (OUTPATIENT)
Age: 45
End: 2020-03-02

## 2020-12-14 ENCOUNTER — HEALTH MAINTENANCE LETTER (OUTPATIENT)
Age: 45
End: 2020-12-14

## 2021-04-18 ENCOUNTER — HEALTH MAINTENANCE LETTER (OUTPATIENT)
Age: 46
End: 2021-04-18

## 2021-10-02 ENCOUNTER — HEALTH MAINTENANCE LETTER (OUTPATIENT)
Age: 46
End: 2021-10-02

## 2022-05-14 ENCOUNTER — HEALTH MAINTENANCE LETTER (OUTPATIENT)
Age: 47
End: 2022-05-14

## 2022-09-03 ENCOUNTER — HEALTH MAINTENANCE LETTER (OUTPATIENT)
Age: 47
End: 2022-09-03

## 2023-06-03 ENCOUNTER — HEALTH MAINTENANCE LETTER (OUTPATIENT)
Age: 48
End: 2023-06-03